# Patient Record
Sex: FEMALE | Race: BLACK OR AFRICAN AMERICAN | NOT HISPANIC OR LATINO | URBAN - METROPOLITAN AREA
[De-identification: names, ages, dates, MRNs, and addresses within clinical notes are randomized per-mention and may not be internally consistent; named-entity substitution may affect disease eponyms.]

---

## 2018-01-18 ENCOUNTER — HISTORICAL (OUTPATIENT)
Dept: ADMINISTRATIVE | Facility: HOSPITAL | Age: 43
End: 2018-01-18

## 2022-04-09 ENCOUNTER — HISTORICAL (OUTPATIENT)
Dept: ADMINISTRATIVE | Facility: HOSPITAL | Age: 47
End: 2022-04-09

## 2022-04-26 VITALS
WEIGHT: 140 LBS | SYSTOLIC BLOOD PRESSURE: 150 MMHG | DIASTOLIC BLOOD PRESSURE: 87 MMHG | BODY MASS INDEX: 28.22 KG/M2 | OXYGEN SATURATION: 98 % | HEIGHT: 59 IN

## 2024-11-07 ENCOUNTER — OFFICE VISIT (OUTPATIENT)
Dept: URGENT CARE | Facility: CLINIC | Age: 49
End: 2024-11-07
Payer: COMMERCIAL

## 2024-11-07 VITALS
HEIGHT: 59 IN | DIASTOLIC BLOOD PRESSURE: 76 MMHG | HEART RATE: 74 BPM | SYSTOLIC BLOOD PRESSURE: 139 MMHG | TEMPERATURE: 98 F | BODY MASS INDEX: 26.21 KG/M2 | RESPIRATION RATE: 19 BRPM | WEIGHT: 130 LBS | OXYGEN SATURATION: 100 %

## 2024-11-07 DIAGNOSIS — R30.0 DYSURIA: Primary | ICD-10-CM

## 2024-11-07 DIAGNOSIS — N90.89 LABIAL IRRITATION: ICD-10-CM

## 2024-11-07 PROCEDURE — 99213 OFFICE O/P EST LOW 20 MIN: CPT | Mod: ,,, | Performed by: NURSE PRACTITIONER

## 2024-11-07 RX ORDER — MULTIVITAMIN
1 TABLET ORAL EVERY MORNING
COMMUNITY

## 2024-11-07 RX ORDER — BUSPIRONE HYDROCHLORIDE 15 MG/1
TABLET ORAL
COMMUNITY
Start: 2024-07-10

## 2024-11-07 RX ORDER — DULOXETIN HYDROCHLORIDE 60 MG/1
60 CAPSULE, DELAYED RELEASE ORAL EVERY MORNING
COMMUNITY

## 2024-11-07 RX ORDER — FLUCONAZOLE 150 MG/1
150 TABLET ORAL DAILY
Qty: 1 TABLET | Refills: 0 | Status: SHIPPED | OUTPATIENT
Start: 2024-11-07 | End: 2024-11-08

## 2024-11-07 RX ORDER — ESTRADIOL 1 MG/G
GEL TOPICAL
COMMUNITY
Start: 2024-09-21

## 2024-11-07 RX ORDER — PROGESTERONE 200 MG/1
200 CAPSULE ORAL
COMMUNITY

## 2024-11-07 RX ORDER — ZOLPIDEM TARTRATE 10 MG/1
10 TABLET ORAL NIGHTLY
COMMUNITY

## 2024-11-07 RX ORDER — SULFAMETHOXAZOLE AND TRIMETHOPRIM 800; 160 MG/1; MG/1
1 TABLET ORAL 2 TIMES DAILY
Qty: 14 TABLET | Refills: 0 | Status: SHIPPED | OUTPATIENT
Start: 2024-11-07 | End: 2024-11-14

## 2024-11-07 NOTE — PROGRESS NOTES
"Subjective:      Patient ID: Vernell Conway is a 49 y.o. female.    Vitals:  height is 4' 11" (1.499 m) and weight is 59 kg (130 lb). Her temperature is 98.2 °F (36.8 °C). Her blood pressure is 139/76 and her pulse is 74. Her respiration is 19 and oxygen saturation is 100%.     Chief Complaint: Pain     Patient is a 49 y.o. female who presents to urgent care with complaints of pain to labia. After getting a wax about two weeks ago. Patient has a hx of bacterial infections and she thinks that is what it is.      She states the inner aspect of the left labia is irritated but not swollen has a white discharge but is unlike normal yeast infections.  Denies any known abscess or tenderness of an area concerning of a abscess or induration of tissue.  Only the labial tissue itself is irritated and painful to touch.  Denies any other discharge areas denies any pain near the follicles of hair that were removed during the wax denies any folliculitis type event.  Denies any fever or concerns over STDs or UTIs currently denies any burning with urination bladder spasms back pain.  Has attempted using Diflucan without any alleviation of symptoms.    Dysuria       Genitourinary:  Positive for vaginal pain, vaginal discharge and genital sore. Negative for vaginal bleeding and vaginal odor.      Objective:     Physical Exam   Constitutional: She is oriented to person, place, and time. She appears well-developed. She is cooperative.  Non-toxic appearance. She does not appear ill. No distress.   HENT:   Head: Normocephalic and atraumatic.   Ears:   Right Ear: Hearing, tympanic membrane, external ear and ear canal normal.   Left Ear: Hearing, tympanic membrane, external ear and ear canal normal.   Nose: Nose normal. No mucosal edema, rhinorrhea or nasal deformity. No epistaxis. Right sinus exhibits no maxillary sinus tenderness and no frontal sinus tenderness. Left sinus exhibits no maxillary sinus tenderness and no frontal sinus " tenderness.   Mouth/Throat: Uvula is midline, oropharynx is clear and moist and mucous membranes are normal. No trismus in the jaw. Normal dentition. No uvula swelling. No oropharyngeal exudate, posterior oropharyngeal edema or posterior oropharyngeal erythema.   Eyes: Conjunctivae and lids are normal. No scleral icterus.   Neck: Trachea normal and phonation normal. Neck supple. No edema present. No erythema present. No neck rigidity present.   Cardiovascular: Normal rate, regular rhythm, normal heart sounds and normal pulses.   Pulmonary/Chest: Effort normal and breath sounds normal. No respiratory distress. She has no decreased breath sounds. She has no rhonchi.   Abdominal: Normal appearance.   Musculoskeletal: Normal range of motion.         General: No deformity. Normal range of motion.   Neurological: She is alert and oriented to person, place, and time. She exhibits normal muscle tone. Coordination normal.   Skin: Skin is warm, dry, intact, not diaphoretic and not pale.   Psychiatric: Her speech is normal and behavior is normal. Judgment and thought content normal.   Nursing note and vitals reviewed.    Patient currently wished to only discussed current situation and not to have full examination currently.         Previous History      Review of patient's allergies indicates:  No Known Allergies    Past Medical History:   Diagnosis Date    Anxiety     Depression      Current Outpatient Medications   Medication Instructions    busPIRone (BUSPAR) 15 MG tablet SMARTSI.5 Tablet(s) By Mouth Morning-Night    DULoxetine (CYMBALTA) 60 mg, Every morning    estradioL (DIVIGEL) 1 mg/gram (0.1 %) topical gel SMARTSI Topical Daily    fluconazole (DIFLUCAN) 150 mg, Oral, Daily    multivitamin (THERAGRAN) per tablet 1 tablet, Every morning    progesterone (PROMETRIUM) 200 mg    sulfamethoxazole-trimethoprim 800-160mg (BACTRIM DS) 800-160 mg Tab 1 tablet, Oral, 2 times daily    zolpidem (AMBIEN) 10 mg, Nightly     Past  "Surgical History:   Procedure Laterality Date     SECTION      hand sx Left          Social History     Tobacco Use    Smoking status: Never    Smokeless tobacco: Never   Substance Use Topics    Alcohol use: Yes    Drug use: Never        Physical Exam      Vital Signs Reviewed   /76 (Patient Position: Sitting)   Pulse 74   Temp 98.2 °F (36.8 °C)   Resp 19   Ht 4' 11" (1.499 m)   Wt 59 kg (130 lb)   SpO2 100%   BMI 26.26 kg/m²        Procedures    Procedures     Labs   No results found for this or any previous visit.   Assessment:     1. Dysuria    2. Labial irritation        Plan:   We will repeat dose of Diflucan and also use Bactrim for broad-spectrum coverage for any type of skin infection discussed close monitoring for the next 3 days if no improvement is noted we will have this site re-evaluated    Dysuria  -     POCT Urinalysis, Dipstick, Automated, W/O Scope    Labial irritation    Other orders  -     sulfamethoxazole-trimethoprim 800-160mg (BACTRIM DS) 800-160 mg Tab; Take 1 tablet by mouth 2 (two) times daily. for 7 days  Dispense: 14 tablet; Refill: 0  -     fluconazole (DIFLUCAN) 150 MG Tab; Take 1 tablet (150 mg total) by mouth once daily. for 1 day  Dispense: 1 tablet; Refill: 0                    "